# Patient Record
Sex: FEMALE | Race: BLACK OR AFRICAN AMERICAN | NOT HISPANIC OR LATINO | Employment: FULL TIME | URBAN - METROPOLITAN AREA
[De-identification: names, ages, dates, MRNs, and addresses within clinical notes are randomized per-mention and may not be internally consistent; named-entity substitution may affect disease eponyms.]

---

## 2023-06-29 ENCOUNTER — APPOINTMENT (OUTPATIENT)
Dept: PRIMARY CARE | Facility: CLINIC | Age: 48
End: 2023-06-29
Payer: COMMERCIAL

## 2023-07-28 ENCOUNTER — LAB (OUTPATIENT)
Dept: LAB | Facility: LAB | Age: 48
End: 2023-07-28
Payer: COMMERCIAL

## 2023-07-28 ENCOUNTER — OFFICE VISIT (OUTPATIENT)
Dept: PRIMARY CARE | Facility: CLINIC | Age: 48
End: 2023-07-28
Payer: COMMERCIAL

## 2023-07-28 VITALS
SYSTOLIC BLOOD PRESSURE: 108 MMHG | DIASTOLIC BLOOD PRESSURE: 70 MMHG | HEIGHT: 70 IN | BODY MASS INDEX: 31.5 KG/M2 | HEART RATE: 81 BPM | WEIGHT: 220 LBS | OXYGEN SATURATION: 97 %

## 2023-07-28 DIAGNOSIS — K76.0 NAFLD (NONALCOHOLIC FATTY LIVER DISEASE): ICD-10-CM

## 2023-07-28 DIAGNOSIS — E05.90 HYPERTHYROIDISM: ICD-10-CM

## 2023-07-28 DIAGNOSIS — Z12.31 ENCOUNTER FOR SCREENING MAMMOGRAM FOR MALIGNANT NEOPLASM OF BREAST: ICD-10-CM

## 2023-07-28 DIAGNOSIS — E55.9 VITAMIN D DEFICIENCY: ICD-10-CM

## 2023-07-28 DIAGNOSIS — B96.89 BV (BACTERIAL VAGINOSIS): ICD-10-CM

## 2023-07-28 DIAGNOSIS — M25.551 RIGHT HIP PAIN: Primary | ICD-10-CM

## 2023-07-28 DIAGNOSIS — B20 HIV INFECTION, UNSPECIFIED SYMPTOM STATUS (MULTI): ICD-10-CM

## 2023-07-28 DIAGNOSIS — B37.9 YEAST INFECTION: ICD-10-CM

## 2023-07-28 DIAGNOSIS — M25.551 RIGHT HIP PAIN: ICD-10-CM

## 2023-07-28 DIAGNOSIS — Z00.00 WELLNESS EXAMINATION: ICD-10-CM

## 2023-07-28 DIAGNOSIS — N76.0 BV (BACTERIAL VAGINOSIS): ICD-10-CM

## 2023-07-28 DIAGNOSIS — K21.9 GASTROESOPHAGEAL REFLUX DISEASE, UNSPECIFIED WHETHER ESOPHAGITIS PRESENT: ICD-10-CM

## 2023-07-28 PROBLEM — M54.30 SCIATICA: Status: ACTIVE | Noted: 2023-07-28

## 2023-07-28 PROBLEM — D12.6 ADENOMATOUS POLYP OF COLON: Status: ACTIVE | Noted: 2023-07-28

## 2023-07-28 PROBLEM — R06.83 SNORING: Status: ACTIVE | Noted: 2023-07-28

## 2023-07-28 PROBLEM — G89.18 ACUTE POST-OPERATIVE PAIN: Status: ACTIVE | Noted: 2023-07-28

## 2023-07-28 PROBLEM — T78.40XA ALLERGIC RESPONSE: Status: ACTIVE | Noted: 2023-07-28

## 2023-07-28 PROBLEM — J32.9 SINUSITIS: Status: ACTIVE | Noted: 2023-07-28

## 2023-07-28 PROBLEM — E04.0 DIFFUSE GOITER: Status: ACTIVE | Noted: 2023-07-28

## 2023-07-28 PROBLEM — D22.9 NUMEROUS MOLES: Status: ACTIVE | Noted: 2023-07-28

## 2023-07-28 PROBLEM — R53.83 FATIGUE: Status: ACTIVE | Noted: 2023-07-28

## 2023-07-28 PROBLEM — B00.9 HSV-1 INFECTION: Status: ACTIVE | Noted: 2023-07-28

## 2023-07-28 PROBLEM — N91.2 AMENORRHEA: Status: ACTIVE | Noted: 2023-07-28

## 2023-07-28 PROBLEM — G43.909 MIGRAINE: Status: ACTIVE | Noted: 2023-07-28

## 2023-07-28 PROBLEM — R23.2 HOT FLASHES: Status: ACTIVE | Noted: 2023-07-28

## 2023-07-28 PROBLEM — K64.9 HEMORRHOIDS: Status: ACTIVE | Noted: 2023-07-28

## 2023-07-28 PROBLEM — N39.0 UTI (URINARY TRACT INFECTION): Status: ACTIVE | Noted: 2023-07-28

## 2023-07-28 PROBLEM — R35.0 URINARY FREQUENCY: Status: ACTIVE | Noted: 2023-07-28

## 2023-07-28 PROBLEM — H53.8 BLURRY VISION: Status: ACTIVE | Noted: 2023-07-28

## 2023-07-28 PROBLEM — H92.03 OTALGIA OF BOTH EARS: Status: ACTIVE | Noted: 2023-07-28

## 2023-07-28 PROBLEM — B97.7 HPV IN FEMALE: Status: ACTIVE | Noted: 2023-07-28

## 2023-07-28 PROBLEM — K26.9 DUODENAL EROSION: Status: ACTIVE | Noted: 2023-07-28

## 2023-07-28 PROBLEM — J30.2 SEASONAL ALLERGIES: Status: ACTIVE | Noted: 2023-07-28

## 2023-07-28 PROBLEM — R10.2 PELVIC PAIN IN FEMALE: Status: ACTIVE | Noted: 2023-07-28

## 2023-07-28 PROBLEM — B37.31 YEAST INFECTION OF THE VAGINA: Status: ACTIVE | Noted: 2023-07-28

## 2023-07-28 PROBLEM — N93.9 ABNORMAL UTERINE BLEEDING (AUB): Status: ACTIVE | Noted: 2023-07-28

## 2023-07-28 PROBLEM — M54.9 BACK PAIN: Status: ACTIVE | Noted: 2023-07-28

## 2023-07-28 PROBLEM — R87.613 HSIL (HIGH GRADE SQUAMOUS INTRAEPITHELIAL LESION) ON PAP SMEAR OF CERVIX: Status: ACTIVE | Noted: 2023-07-28

## 2023-07-28 PROBLEM — F32.A DEPRESSION: Status: ACTIVE | Noted: 2023-07-28

## 2023-07-28 PROBLEM — K57.90 DIVERTICULOSIS: Status: ACTIVE | Noted: 2023-07-28

## 2023-07-28 PROBLEM — L85.3 DRY SKIN DERMATITIS: Status: ACTIVE | Noted: 2023-07-28

## 2023-07-28 PROBLEM — G47.00 INSOMNIA: Status: ACTIVE | Noted: 2023-07-28

## 2023-07-28 PROBLEM — N89.8 FOUL SMELLING VAGINAL DISCHARGE: Status: ACTIVE | Noted: 2023-07-28

## 2023-07-28 PROBLEM — L91.8 SKIN TAGS, MULTIPLE ACQUIRED: Status: ACTIVE | Noted: 2023-07-28

## 2023-07-28 LAB
ALANINE AMINOTRANSFERASE (SGPT) (U/L) IN SER/PLAS: 20 U/L (ref 7–45)
ALBUMIN (G/DL) IN SER/PLAS: 4.3 G/DL (ref 3.4–5)
ALKALINE PHOSPHATASE (U/L) IN SER/PLAS: 66 U/L (ref 33–110)
ANION GAP IN SER/PLAS: 10 MMOL/L (ref 10–20)
ASPARTATE AMINOTRANSFERASE (SGOT) (U/L) IN SER/PLAS: 21 U/L (ref 9–39)
BILIRUBIN TOTAL (MG/DL) IN SER/PLAS: 0.3 MG/DL (ref 0–1.2)
CALCIDIOL (25 OH VITAMIN D3) (NG/ML) IN SER/PLAS: 36 NG/ML
CALCIUM (MG/DL) IN SER/PLAS: 10 MG/DL (ref 8.6–10.6)
CARBON DIOXIDE, TOTAL (MMOL/L) IN SER/PLAS: 26 MMOL/L (ref 21–32)
CHLORIDE (MMOL/L) IN SER/PLAS: 106 MMOL/L (ref 98–107)
CHOLESTEROL (MG/DL) IN SER/PLAS: 201 MG/DL (ref 0–199)
CHOLESTEROL IN HDL (MG/DL) IN SER/PLAS: 57.5 MG/DL
CHOLESTEROL/HDL RATIO: 3.5
CREATININE (MG/DL) IN SER/PLAS: 0.9 MG/DL (ref 0.5–1.05)
ERYTHROCYTE DISTRIBUTION WIDTH (RATIO) BY AUTOMATED COUNT: 14.7 % (ref 11.5–14.5)
ERYTHROCYTE MEAN CORPUSCULAR HEMOGLOBIN CONCENTRATION (G/DL) BY AUTOMATED: 32.1 G/DL (ref 32–36)
ERYTHROCYTE MEAN CORPUSCULAR VOLUME (FL) BY AUTOMATED COUNT: 86 FL (ref 80–100)
ERYTHROCYTES (10*6/UL) IN BLOOD BY AUTOMATED COUNT: 4.66 X10E12/L (ref 4–5.2)
GFR FEMALE: 78 ML/MIN/1.73M2
GLUCOSE (MG/DL) IN SER/PLAS: 91 MG/DL (ref 74–99)
HEMATOCRIT (%) IN BLOOD BY AUTOMATED COUNT: 40.2 % (ref 36–46)
HEMOGLOBIN (G/DL) IN BLOOD: 12.9 G/DL (ref 12–16)
LDL: 122 MG/DL (ref 0–99)
LEUKOCYTES (10*3/UL) IN BLOOD BY AUTOMATED COUNT: 6.9 X10E9/L (ref 4.4–11.3)
NRBC (PER 100 WBCS) BY AUTOMATED COUNT: 0 /100 WBC (ref 0–0)
PLATELETS (10*3/UL) IN BLOOD AUTOMATED COUNT: 245 X10E9/L (ref 150–450)
POTASSIUM (MMOL/L) IN SER/PLAS: 4.4 MMOL/L (ref 3.5–5.3)
PROTEIN TOTAL: 7.1 G/DL (ref 6.4–8.2)
SODIUM (MMOL/L) IN SER/PLAS: 138 MMOL/L (ref 136–145)
THYROTROPIN (MIU/L) IN SER/PLAS BY DETECTION LIMIT <= 0.05 MIU/L: 1.91 MIU/L (ref 0.44–3.98)
TRIGLYCERIDE (MG/DL) IN SER/PLAS: 109 MG/DL (ref 0–149)
UREA NITROGEN (MG/DL) IN SER/PLAS: 11 MG/DL (ref 6–23)
VLDL: 22 MG/DL (ref 0–40)

## 2023-07-28 PROCEDURE — 99214 OFFICE O/P EST MOD 30 MIN: CPT | Performed by: STUDENT IN AN ORGANIZED HEALTH CARE EDUCATION/TRAINING PROGRAM

## 2023-07-28 PROCEDURE — 80053 COMPREHEN METABOLIC PANEL: CPT

## 2023-07-28 PROCEDURE — 82306 VITAMIN D 25 HYDROXY: CPT

## 2023-07-28 PROCEDURE — 80061 LIPID PANEL: CPT

## 2023-07-28 PROCEDURE — 36415 COLL VENOUS BLD VENIPUNCTURE: CPT

## 2023-07-28 PROCEDURE — 1036F TOBACCO NON-USER: CPT | Performed by: STUDENT IN AN ORGANIZED HEALTH CARE EDUCATION/TRAINING PROGRAM

## 2023-07-28 PROCEDURE — 90715 TDAP VACCINE 7 YRS/> IM: CPT | Performed by: STUDENT IN AN ORGANIZED HEALTH CARE EDUCATION/TRAINING PROGRAM

## 2023-07-28 PROCEDURE — 85027 COMPLETE CBC AUTOMATED: CPT

## 2023-07-28 PROCEDURE — 90471 IMMUNIZATION ADMIN: CPT | Performed by: STUDENT IN AN ORGANIZED HEALTH CARE EDUCATION/TRAINING PROGRAM

## 2023-07-28 PROCEDURE — 99396 PREV VISIT EST AGE 40-64: CPT | Performed by: STUDENT IN AN ORGANIZED HEALTH CARE EDUCATION/TRAINING PROGRAM

## 2023-07-28 PROCEDURE — 84443 ASSAY THYROID STIM HORMONE: CPT

## 2023-07-28 RX ORDER — METRONIDAZOLE 7.5 MG/G
1 GEL VAGINAL 2 TIMES DAILY
Qty: 70 G | Refills: 0 | Status: SHIPPED | OUTPATIENT
Start: 2023-07-28 | End: 2023-08-02

## 2023-07-28 RX ORDER — IBUPROFEN 800 MG/1
TABLET ORAL
COMMUNITY
Start: 2023-03-17 | End: 2023-07-28 | Stop reason: ALTCHOICE

## 2023-07-28 RX ORDER — NORETHINDRONE ACETATE AND ETHINYL ESTRADIOL 1MG-20(21)
KIT ORAL
COMMUNITY
Start: 2022-12-09 | End: 2023-07-28 | Stop reason: ALTCHOICE

## 2023-07-28 RX ORDER — FLUCONAZOLE 150 MG/1
150 TABLET ORAL ONCE
Qty: 1 TABLET | Refills: 0 | Status: SHIPPED | OUTPATIENT
Start: 2023-07-28 | End: 2023-07-28

## 2023-07-28 ASSESSMENT — COLUMBIA-SUICIDE SEVERITY RATING SCALE - C-SSRS
6. HAVE YOU EVER DONE ANYTHING, STARTED TO DO ANYTHING, OR PREPARED TO DO ANYTHING TO END YOUR LIFE?: NO
2. HAVE YOU ACTUALLY HAD ANY THOUGHTS OF KILLING YOURSELF?: NO
1. IN THE PAST MONTH, HAVE YOU WISHED YOU WERE DEAD OR WISHED YOU COULD GO TO SLEEP AND NOT WAKE UP?: NO

## 2023-07-28 ASSESSMENT — PATIENT HEALTH QUESTIONNAIRE - PHQ9
1. LITTLE INTEREST OR PLEASURE IN DOING THINGS: NOT AT ALL
2. FEELING DOWN, DEPRESSED OR HOPELESS: NOT AT ALL
SUM OF ALL RESPONSES TO PHQ9 QUESTIONS 1 AND 2: 0

## 2023-07-28 ASSESSMENT — ENCOUNTER SYMPTOMS
OCCASIONAL FEELINGS OF UNSTEADINESS: 0
LOSS OF SENSATION IN FEET: 0
DEPRESSION: 0

## 2023-07-28 NOTE — PROGRESS NOTES
HPI: 47-year-old female presenting for follow-up on multiple concerns, CPE.    TMJ dysfunction  Resolved    Right hip pain  Chronic, insidious onset.  Did not do physical therapy last time    HIV  Following with specialist    NAFLD  Incidental finding on CT, no elevated LFTs on last lab.  Asymptomatic.    Vaginal pruritus and discharge  She describes the discharge as likely chunky, but has history of recurrent BV.  Was treated 2 months ago with metronidazole, resolved for 2 months.    SocHx:   - Smoking: Quit 3 years ago   - Alcohol: None   - Drug use: None    Denies HA, changes in vision, chest pain, SOB/CHAN, palpitations, N/V/D/C, dysuria/hematuria, hematochezia/melena, paresthesias, LE edema.    12 point ROS reviewed and negative other than as stated in HPI      General: Alert, oriented, pleasant, in no acute distress  HEENT:      Head: normocephalic, atraumatic;      eyes: EOMI, no scleral icterus;   Neck: soft, supple, non-tender, no masses appreciated  CV: Heart with regular rate and rhythm, normal S1/S2, no murmurs  Lungs: CTAB without wheezing, rhonchi or rales; good respiratory effort, no increased work of breathing  Abdomen: soft, non-tender, non-distended, no masses appreciated  Extremities: no edema, no cyanosis  Neuro: Cranial nerves grossly intact; alert and oriented, normal gait  Psych: Appropriate mood and affect    1. HM  -CBC, CMP, Lipid panel, Vit D, TSH with reflex T4  -Vaccines:       Flu: out of  season      Shingrix: Will be done in a couple weeks      Pneumococcal: Follows       Tdap: IO  -Gladis w/ lobo: ordered  -Last PAP: UTD, follows with GYN  -Lung cancer screening with low-dose CT: quit 3 years ago  -Colonoscopy: 2022, 5 year plan  -Osteoporosis screening: at 63    2.  TMJ dysfunction  - Resolved with 6-week protocol     3. R hip pain  -Physical therapy referral     4.  Vaginal discharge/pruritus  - History more concerning for candidiasis, declined swab in office  - We will trial  fluconazole 150 mg x 1  - Metronidazole gel given as pocket prescription     5. HIV  -Follows with specialist     6. NAFLD  -Most current LFTs within normal limits  - Ultrasound liver    F/U 6 months or sooner if indicated    Chris D'Amico, DO

## 2023-07-31 ENCOUNTER — DOCUMENTATION (OUTPATIENT)
Dept: PRIMARY CARE | Facility: CLINIC | Age: 48
End: 2023-07-31
Payer: COMMERCIAL

## 2023-07-31 ENCOUNTER — TELEPHONE (OUTPATIENT)
Dept: PRIMARY CARE | Facility: CLINIC | Age: 48
End: 2023-07-31
Payer: COMMERCIAL

## 2023-07-31 NOTE — TELEPHONE ENCOUNTER
Provided patient with lab results. Pt expressed understanding.      ----- Message from Christopher D'Amico, DO sent at 7/31/2023  8:46 AM EDT -----  LDL borderline high at 122, however very low ASCVD risk.  Medication not warranted at this time.  Continue to practice healthy dietary habits.    Remaining labs unremarkable.

## 2023-07-31 NOTE — RESULT ENCOUNTER NOTE
LDL borderline high at 122, however very low ASCVD risk.  Medication not warranted at this time.  Continue to practice healthy dietary habits.    Remaining labs unremarkable.

## 2023-08-11 ENCOUNTER — TELEPHONE (OUTPATIENT)
Dept: PRIMARY CARE | Facility: CLINIC | Age: 48
End: 2023-08-11
Payer: COMMERCIAL

## 2023-08-11 DIAGNOSIS — L60.0 INGROWN NAIL: Primary | ICD-10-CM

## 2023-08-11 NOTE — TELEPHONE ENCOUNTER
Left message for patient on vm///Patient is requesting a referral for Podiatry, she has an ingrown nail

## 2023-10-13 ENCOUNTER — PHARMACY VISIT (OUTPATIENT)
Dept: PHARMACY | Facility: CLINIC | Age: 48
End: 2023-10-13
Payer: MEDICARE

## 2023-10-13 PROCEDURE — RXMED WILLOW AMBULATORY MEDICATION CHARGE

## 2023-11-08 ENCOUNTER — PHARMACY VISIT (OUTPATIENT)
Dept: PHARMACY | Facility: CLINIC | Age: 48
End: 2023-11-08
Payer: MEDICARE

## 2023-11-16 ENCOUNTER — PHARMACY VISIT (OUTPATIENT)
Dept: PHARMACY | Facility: CLINIC | Age: 48
End: 2023-11-16
Payer: MEDICARE

## 2023-11-16 PROCEDURE — RXMED WILLOW AMBULATORY MEDICATION CHARGE

## 2024-01-04 ENCOUNTER — TELEPHONE (OUTPATIENT)
Dept: PRIMARY CARE | Facility: CLINIC | Age: 49
End: 2024-01-04
Payer: COMMERCIAL

## 2024-01-04 NOTE — TELEPHONE ENCOUNTER
LEFT PATIENT A DETAILED MESSAGE///Patient lost her mother on 12/30/2023 and is requesting medication to help her sleep and for anxiety, her Psychiatrist advised her to ask her pcp?

## 2024-01-05 DIAGNOSIS — G47.00 INSOMNIA, UNSPECIFIED TYPE: Primary | ICD-10-CM

## 2024-01-05 RX ORDER — HYDROXYZINE HYDROCHLORIDE 25 MG/1
25 TABLET, FILM COATED ORAL 2 TIMES DAILY
Qty: 60 TABLET | Refills: 0 | Status: SHIPPED | OUTPATIENT
Start: 2024-01-05 | End: 2024-02-04

## 2024-01-11 ENCOUNTER — APPOINTMENT (OUTPATIENT)
Dept: IMMUNOLOGY | Facility: CLINIC | Age: 49
End: 2024-01-11
Payer: COMMERCIAL

## 2024-01-11 ENCOUNTER — PHARMACY VISIT (OUTPATIENT)
Dept: PHARMACY | Facility: CLINIC | Age: 49
End: 2024-01-11
Payer: MEDICARE

## 2024-01-11 PROCEDURE — RXMED WILLOW AMBULATORY MEDICATION CHARGE

## 2024-01-25 PROBLEM — B20 HUMAN IMMUNODEFICIENCY VIRUS INFECTION (MULTI): Status: ACTIVE | Noted: 2024-01-25

## 2024-01-25 PROBLEM — L91.0 KELOID: Status: ACTIVE | Noted: 2022-04-07

## 2024-01-25 PROBLEM — F17.200 TOBACCO DEPENDENCE SYNDROME: Status: ACTIVE | Noted: 2024-01-25

## 2024-01-25 PROBLEM — Z21 HUMAN IMMUNODEFICIENCY VIRUS INFECTION: Status: ACTIVE | Noted: 2024-01-25

## 2024-02-26 DIAGNOSIS — B20 HUMAN IMMUNODEFICIENCY VIRUS (MULTI): Primary | ICD-10-CM

## 2024-02-26 RX ORDER — ELVITEGRAVIR, COBICISTAT, EMTRICITABINE, AND TENOFOVIR DISOPROXIL FUMARATE 150; 150; 200; 300 MG/1; MG/1; MG/1; MG/1
1 TABLET, FILM COATED ORAL DAILY
Qty: 30 TABLET | Refills: 1 | Status: SHIPPED | OUTPATIENT
Start: 2024-02-26 | End: 2024-04-26 | Stop reason: SDUPTHER

## 2024-02-27 PROCEDURE — RXMED WILLOW AMBULATORY MEDICATION CHARGE

## 2024-02-28 ENCOUNTER — PHARMACY VISIT (OUTPATIENT)
Dept: PHARMACY | Facility: CLINIC | Age: 49
End: 2024-02-28
Payer: MEDICARE

## 2024-03-27 PROCEDURE — RXMED WILLOW AMBULATORY MEDICATION CHARGE

## 2024-03-30 ENCOUNTER — PHARMACY VISIT (OUTPATIENT)
Dept: PHARMACY | Facility: CLINIC | Age: 49
End: 2024-03-30
Payer: MEDICARE

## 2024-04-26 DIAGNOSIS — B20 HIV INFECTION, UNSPECIFIED SYMPTOM STATUS (MULTI): ICD-10-CM

## 2024-04-26 DIAGNOSIS — Z11.3 SCREENING FOR STD (SEXUALLY TRANSMITTED DISEASE): ICD-10-CM

## 2024-04-26 DIAGNOSIS — Z79.899 HIGH RISK MEDICATION USE: ICD-10-CM

## 2024-04-26 DIAGNOSIS — B20 HUMAN IMMUNODEFICIENCY VIRUS (MULTI): ICD-10-CM

## 2024-04-26 PROCEDURE — RXMED WILLOW AMBULATORY MEDICATION CHARGE

## 2024-04-26 RX ORDER — ELVITEGRAVIR, COBICISTAT, EMTRICITABINE, AND TENOFOVIR DISOPROXIL FUMARATE 150; 150; 200; 300 MG/1; MG/1; MG/1; MG/1
1 TABLET, FILM COATED ORAL DAILY
Qty: 30 TABLET | Refills: 0 | Status: SHIPPED | OUTPATIENT
Start: 2024-04-26

## 2024-04-26 NOTE — TELEPHONE ENCOUNTER
Patient calling for refill on Stribild. She recently moved and unable to locate pills. She has been off x 2 weeks. Will send refill to [facility]. Also, pt has missed last appt w/ Dr. Castillo. No labs since April of 2022. Told pt she must come in for lab work. Pt agreed to next week.

## 2024-04-30 ENCOUNTER — PHARMACY VISIT (OUTPATIENT)
Dept: PHARMACY | Facility: CLINIC | Age: 49
End: 2024-04-30
Payer: MEDICARE

## 2024-07-05 DIAGNOSIS — B20 HUMAN IMMUNODEFICIENCY VIRUS (MULTI): ICD-10-CM

## 2024-07-08 PROCEDURE — RXMED WILLOW AMBULATORY MEDICATION CHARGE

## 2024-07-08 RX ORDER — ELVITEGRAVIR, COBICISTAT, EMTRICITABINE, AND TENOFOVIR DISOPROXIL FUMARATE 150; 150; 200; 300 MG/1; MG/1; MG/1; MG/1
1 TABLET, FILM COATED ORAL DAILY
Qty: 30 TABLET | Refills: 0 | Status: SHIPPED | OUTPATIENT
Start: 2024-07-08

## 2024-07-13 ENCOUNTER — PHARMACY VISIT (OUTPATIENT)
Dept: PHARMACY | Facility: CLINIC | Age: 49
End: 2024-07-13
Payer: MEDICARE

## 2024-08-01 ENCOUNTER — APPOINTMENT (OUTPATIENT)
Dept: IMMUNOLOGY | Facility: CLINIC | Age: 49
End: 2024-08-01
Payer: COMMERCIAL

## 2024-08-27 ENCOUNTER — APPOINTMENT (OUTPATIENT)
Dept: PRIMARY CARE | Facility: CLINIC | Age: 49
End: 2024-08-27
Payer: COMMERCIAL

## 2024-09-09 ENCOUNTER — HOSPITAL ENCOUNTER (EMERGENCY)
Facility: HOSPITAL | Age: 49
Discharge: HOME | End: 2024-09-09
Payer: COMMERCIAL

## 2024-09-09 PROCEDURE — 4500999001 HC ED NO CHARGE: Performed by: STUDENT IN AN ORGANIZED HEALTH CARE EDUCATION/TRAINING PROGRAM

## 2024-09-19 DIAGNOSIS — B20 HUMAN IMMUNODEFICIENCY VIRUS (MULTI): ICD-10-CM

## 2024-09-19 DIAGNOSIS — B20 HIV DISEASE (MULTI): ICD-10-CM

## 2024-09-19 DIAGNOSIS — B20 HIV DISEASE (MULTI): Primary | ICD-10-CM

## 2024-09-19 RX ORDER — ELVITEGRAVIR, COBICISTAT, EMTRICITABINE, AND TENOFOVIR DISOPROXIL FUMARATE 150; 150; 200; 300 MG/1; MG/1; MG/1; MG/1
1 TABLET, FILM COATED ORAL DAILY
Qty: 30 TABLET | Refills: 0 | Status: SHIPPED | OUTPATIENT
Start: 2024-09-19

## 2024-09-19 RX ORDER — ELVITEGRAVIR, COBICISTAT, EMTRICITABINE, AND TENOFOVIR DISOPROXIL FUMARATE 150; 150; 200; 300 MG/1; MG/1; MG/1; MG/1
1 TABLET, FILM COATED ORAL DAILY
Qty: 30 TABLET | Refills: 0 | Status: SHIPPED | OUTPATIENT
Start: 2024-09-19 | End: 2024-09-19 | Stop reason: SDUPTHER

## 2024-09-30 PROCEDURE — RXMED WILLOW AMBULATORY MEDICATION CHARGE

## 2024-10-03 ENCOUNTER — OFFICE VISIT (OUTPATIENT)
Dept: IMMUNOLOGY | Facility: CLINIC | Age: 49
End: 2024-10-03
Payer: COMMERCIAL

## 2024-10-03 VITALS
BODY MASS INDEX: 31.12 KG/M2 | HEART RATE: 94 BPM | OXYGEN SATURATION: 99 % | RESPIRATION RATE: 16 BRPM | WEIGHT: 217.4 LBS | TEMPERATURE: 98 F | HEIGHT: 70 IN | DIASTOLIC BLOOD PRESSURE: 85 MMHG | SYSTOLIC BLOOD PRESSURE: 124 MMHG

## 2024-10-03 DIAGNOSIS — G47.00 INSOMNIA, UNSPECIFIED TYPE: ICD-10-CM

## 2024-10-03 DIAGNOSIS — Z12.31 ENCOUNTER FOR SCREENING MAMMOGRAM FOR MALIGNANT NEOPLASM OF BREAST: ICD-10-CM

## 2024-10-03 DIAGNOSIS — Z21 ASYMPTOMATIC HIV INFECTION, WITH NO HISTORY OF HIV-RELATED ILLNESS (MULTI): Primary | ICD-10-CM

## 2024-10-03 DIAGNOSIS — J30.9 ALLERGIC RHINITIS, UNSPECIFIED SEASONALITY, UNSPECIFIED TRIGGER: ICD-10-CM

## 2024-10-03 DIAGNOSIS — E04.0 DIFFUSE GOITER: ICD-10-CM

## 2024-10-03 DIAGNOSIS — Z21 HIV INFECTION, UNSPECIFIED SYMPTOM STATUS: ICD-10-CM

## 2024-10-03 DIAGNOSIS — Z79.899 HIGH RISK MEDICATION USE: ICD-10-CM

## 2024-10-03 DIAGNOSIS — Z11.3 SCREENING FOR STD (SEXUALLY TRANSMITTED DISEASE): ICD-10-CM

## 2024-10-03 LAB
ALBUMIN SERPL BCP-MCNC: 4.4 G/DL (ref 3.4–5)
ALP SERPL-CCNC: 59 U/L (ref 33–110)
ALT SERPL W P-5'-P-CCNC: 12 U/L (ref 7–45)
ANION GAP SERPL CALC-SCNC: 12 MMOL/L (ref 10–20)
AST SERPL W P-5'-P-CCNC: 12 U/L (ref 9–39)
BASOPHILS # BLD AUTO: 0.06 X10*3/UL (ref 0–0.1)
BASOPHILS NFR BLD AUTO: 0.9 %
BILIRUB SERPL-MCNC: 0.3 MG/DL (ref 0–1.2)
BUN SERPL-MCNC: 8 MG/DL (ref 6–23)
CALCIUM SERPL-MCNC: 9.8 MG/DL (ref 8.6–10.6)
CHLORIDE SERPL-SCNC: 106 MMOL/L (ref 98–107)
CHOLEST SERPL-MCNC: 211 MG/DL (ref 0–199)
CHOLESTEROL/HDL RATIO: 3.6
CO2 SERPL-SCNC: 24 MMOL/L (ref 21–32)
CREAT SERPL-MCNC: 0.77 MG/DL (ref 0.5–1.05)
EGFRCR SERPLBLD CKD-EPI 2021: >90 ML/MIN/1.73M*2
EOSINOPHIL # BLD AUTO: 0.21 X10*3/UL (ref 0–0.7)
EOSINOPHIL NFR BLD AUTO: 3.1 %
ERYTHROCYTE [DISTWIDTH] IN BLOOD BY AUTOMATED COUNT: 15 % (ref 11.5–14.5)
GLUCOSE SERPL-MCNC: 78 MG/DL (ref 74–99)
HBV CORE AB SER QL: NONREACTIVE
HCT VFR BLD AUTO: 39.3 % (ref 36–46)
HDLC SERPL-MCNC: 58.9 MG/DL
HGB BLD-MCNC: 12.8 G/DL (ref 12–16)
IMM GRANULOCYTES # BLD AUTO: 0.02 X10*3/UL (ref 0–0.7)
IMM GRANULOCYTES NFR BLD AUTO: 0.3 % (ref 0–0.9)
LDLC SERPL CALC-MCNC: 127 MG/DL
LYMPHOCYTES # BLD AUTO: 2.75 X10*3/UL (ref 1.2–4.8)
LYMPHOCYTES NFR BLD AUTO: 40.6 %
MCH RBC QN AUTO: 27 PG (ref 26–34)
MCHC RBC AUTO-ENTMCNC: 32.6 G/DL (ref 32–36)
MCV RBC AUTO: 83 FL (ref 80–100)
MONOCYTES # BLD AUTO: 0.36 X10*3/UL (ref 0.1–1)
MONOCYTES NFR BLD AUTO: 5.3 %
NEUTROPHILS # BLD AUTO: 3.37 X10*3/UL (ref 1.2–7.7)
NEUTROPHILS NFR BLD AUTO: 49.8 %
NON HDL CHOLESTEROL: 152 MG/DL (ref 0–149)
NRBC BLD-RTO: 0 /100 WBCS (ref 0–0)
PLATELET # BLD AUTO: 224 X10*3/UL (ref 150–450)
POTASSIUM SERPL-SCNC: 4.1 MMOL/L (ref 3.5–5.3)
PROT SERPL-MCNC: 7.1 G/DL (ref 6.4–8.2)
RBC # BLD AUTO: 4.74 X10*6/UL (ref 4–5.2)
SODIUM SERPL-SCNC: 138 MMOL/L (ref 136–145)
TREPONEMA PALLIDUM IGG+IGM AB [PRESENCE] IN SERUM OR PLASMA BY IMMUNOASSAY: NONREACTIVE
TRIGL SERPL-MCNC: 125 MG/DL (ref 0–149)
TSH SERPL-ACNC: 1.48 MIU/L (ref 0.44–3.98)
VLDL: 25 MG/DL (ref 0–40)
WBC # BLD AUTO: 6.8 X10*3/UL (ref 4.4–11.3)

## 2024-10-03 PROCEDURE — RXMED WILLOW AMBULATORY MEDICATION CHARGE

## 2024-10-03 PROCEDURE — 86704 HEP B CORE ANTIBODY TOTAL: CPT | Performed by: INTERNAL MEDICINE

## 2024-10-03 PROCEDURE — 99215 OFFICE O/P EST HI 40 MIN: CPT | Performed by: INTERNAL MEDICINE

## 2024-10-03 PROCEDURE — 3008F BODY MASS INDEX DOCD: CPT | Performed by: INTERNAL MEDICINE

## 2024-10-03 PROCEDURE — 86780 TREPONEMA PALLIDUM: CPT | Performed by: INTERNAL MEDICINE

## 2024-10-03 PROCEDURE — 87491 CHLMYD TRACH DNA AMP PROBE: CPT | Performed by: INTERNAL MEDICINE

## 2024-10-03 PROCEDURE — 85025 COMPLETE CBC W/AUTO DIFF WBC: CPT | Performed by: INTERNAL MEDICINE

## 2024-10-03 PROCEDURE — 99417 PROLNG OP E/M EACH 15 MIN: CPT | Performed by: INTERNAL MEDICINE

## 2024-10-03 PROCEDURE — 1036F TOBACCO NON-USER: CPT | Performed by: INTERNAL MEDICINE

## 2024-10-03 PROCEDURE — 88185 FLOWCYTOMETRY/TC ADD-ON: CPT | Performed by: INTERNAL MEDICINE

## 2024-10-03 PROCEDURE — 84520 ASSAY OF UREA NITROGEN: CPT | Performed by: INTERNAL MEDICINE

## 2024-10-03 PROCEDURE — 90656 IIV3 VACC NO PRSV 0.5 ML IM: CPT | Performed by: INTERNAL MEDICINE

## 2024-10-03 PROCEDURE — 4274F FLU IMMUNO ADMIND RCVD: CPT | Performed by: INTERNAL MEDICINE

## 2024-10-03 PROCEDURE — 84443 ASSAY THYROID STIM HORMONE: CPT | Performed by: INTERNAL MEDICINE

## 2024-10-03 PROCEDURE — 87536 HIV-1 QUANT&REVRSE TRNSCRPJ: CPT | Performed by: INTERNAL MEDICINE

## 2024-10-03 PROCEDURE — 80061 LIPID PANEL: CPT | Performed by: INTERNAL MEDICINE

## 2024-10-03 PROCEDURE — 36415 COLL VENOUS BLD VENIPUNCTURE: CPT | Performed by: INTERNAL MEDICINE

## 2024-10-03 RX ORDER — DESLORATADINE 5 MG/1
5 TABLET ORAL DAILY
Qty: 30 TABLET | Refills: 11 | Status: SHIPPED | OUTPATIENT
Start: 2024-10-03 | End: 2025-10-03

## 2024-10-03 ASSESSMENT — PAIN SCALES - GENERAL: PAINLEVEL: 0-NO PAIN

## 2024-10-03 NOTE — ASSESSMENT & PLAN NOTE
Returning to clinic after being absent for > 2 years.  Has continued taking Stribild daily until 3 weeks ago.    Previously, she remained immunologically intact and virologically suppressed on Stribild.    Routine labs will be collected today: CBC w/ diff, Hepatic panel, renal panel, CD4, HIV RNA PCR, and HIV genotype.    If she is undetectable, may transition to Biktarvy.    Key chain pill box given to her for ability to carry med discreetly to work.    Vaccines updated: agreeable to Flu vaccination  STD screening not indicated as not sexually active.    Will RTC in 4 months

## 2024-10-03 NOTE — PROGRESS NOTES
"HIV Clinic Follow-up Visit:    Aggie Villalta was last seen in [facility] on 7/28/22    Missed antiretroviral doses in last 72 hours? Yes, 3 weeks.     Sexually active? no, Partner/s aware of diagnosis? NA,     Condom use?  NA , Partner on PrEP? NA    Tobacco use: No     SUBJECTIVE:   Last seen in 7/2022.  States that she has been taking her medications daily except for last 3 weeks as she has been staying with her daughters as lost her housing.  Initially stated that she was not taking meds for 3 weeks then clarified that she has taken them \"off&on\" for last 3 weeks. Misses because she \"doesn't have them with her\".  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    Diagnosed with Double ear infection at an urgent care in winter.  She had no ear pian just \"itchy ears\" and post nasal drip.  Prescribed meds, little help.  States that she has had them twice. 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  Denies tinnitus or ringing.    Denies ear pain.  Endorses the symptoms of ear popping, itching in ears and post nasal drip.  Also with occasional runny nose. States that these get worse with winter.  Also notes occasional muffled hearing.    Lost her mother in December after multiple hospitalizations at [facility] then at . She is still mourning her loss.    Never sleeps well.  Difficulty with initiating sleep then does not sleep soundly.  States that Melatonin makes her feel groggy as did benadryl.      Review of Systems  Full 10 point ROS performed.  All pertinent positives and negatives as documented in HPI.     CURRENT MEDICATIONS:    Current Outpatient Medications:     desloratadine (Clarinex) 5 mg tablet, Take 1 tablet (5 mg) by mouth once daily., Disp: 30 tablet, Rfl: 11    Stribild [id number] mg tablet, Take 1 tablet by mouth once daily., Disp: 30 tablet, Rfl: 0    PHYSICAL EXAMINATION:  Visit Vitals  BP " "124/85 (BP Location: Left arm, Patient Position: Sitting, BP Cuff Size: Large adult)   Pulse 94   Temp 36.7 °C (98 °F) (Skin)   Resp 16   Ht 1.778 m (5' 10\")   Wt 98.6 kg (217 lb 6.4 oz)   SpO2 99%   BMI 31.19 kg/m²   Smoking Status Former   BSA 2.21 m²       Physical Exam   Physical Exam  Vitals reviewed.   Constitutional:       General: She is not in acute distress.     Appearance: Normal appearance.   HENT:      Head: Normocephalic and atraumatic.      Right Ear: External ear normal. No drainage or tenderness. Tympanic membrane is retracted. Tympanic membrane is not bulging.      Left Ear: Tympanic membrane, ear canal and external ear normal. No drainage or tenderness.      Nose: No signs of injury, congestion or rhinorrhea.      Right Turbinates: Not enlarged or swollen.      Left Turbinates: Swollen (erythema present).      Right Sinus: No maxillary sinus tenderness or frontal sinus tenderness.      Left Sinus: No maxillary sinus tenderness or frontal sinus tenderness.      Mouth/Throat:      Mouth: Mucous membranes are moist.      Pharynx: No oropharyngeal exudate or posterior oropharyngeal erythema.   Eyes:      General: No scleral icterus.     Extraocular Movements: Extraocular movements intact.      Conjunctiva/sclera: Conjunctivae normal.      Pupils: Pupils are equal, round, and reactive to light.   Neck:      Thyroid: Thyromegaly (much less prominent than prior exams) present.   Cardiovascular:      Rate and Rhythm: Normal rate and regular rhythm.      Pulses: Normal pulses.      Heart sounds: Normal heart sounds.   Pulmonary:      Effort: Pulmonary effort is normal.      Breath sounds: Normal breath sounds.   Abdominal:      General: Bowel sounds are normal. There is no distension.      Palpations: Abdomen is soft.   Musculoskeletal:      Cervical back: Normal range of motion and neck supple.   Lymphadenopathy:      Cervical: No cervical adenopathy.   Skin:     General: Skin is warm and dry. "   Neurological:      General: No focal deficit present.      Mental Status: She is alert and oriented to person, place, and time.      Cranial Nerves: No cranial nerve deficit.      Deep Tendon Reflexes: Reflexes normal.   Psychiatric:         Mood and Affect: Mood normal.         PERTINENT DATA:  CBC:  WBC   Date Value Ref Range Status   07/28/2023 6.9 4.4 - 11.3 x10E9/L Final     nRBC   Date Value Ref Range Status   07/28/2023 0.0 0.0 - 0.0 /100 WBC Final     RBC   Date Value Ref Range Status   07/28/2023 4.66 4.00 - 5.20 x10E12/L Final     Hemoglobin   Date Value Ref Range Status   07/28/2023 12.9 12.0 - 16.0 g/dL Final     MCV   Date Value Ref Range Status   07/28/2023 86 80 - 100 fL Final     RDW   Date Value Ref Range Status   07/28/2023 14.7 (H) 11.5 - 14.5 % Final       Renal Function Panel:  Glucose   Date Value Ref Range Status   07/28/2023 91 74 - 99 mg/dL Final     Sodium   Date Value Ref Range Status   07/28/2023 138 136 - 145 mmol/L Final     Potassium   Date Value Ref Range Status   07/28/2023 4.4 3.5 - 5.3 mmol/L Final     Chloride   Date Value Ref Range Status   07/28/2023 106 98 - 107 mmol/L Final     Anion Gap   Date Value Ref Range Status   07/28/2023 10 10 - 20 mmol/L Final     Urea Nitrogen   Date Value Ref Range Status   07/28/2023 11 6 - 23 mg/dL Final     Creatinine   Date Value Ref Range Status   07/28/2023 0.90 0.50 - 1.05 mg/dL Final     Calcium   Date Value Ref Range Status   07/28/2023 10.0 8.6 - 10.6 mg/dL Final     Phosphorus   Date Value Ref Range Status   03/01/2021 3.2 2.5 - 4.9 mg/dL Final     Comment:      The performance characteristics of phosphorus testing in   heparinized plasma have been validated by the individual     laboratory site where testing is performed. Testing    on heparinized plasma is not approved by the FDA;    however, such approval is not necessary.       Albumin   Date Value Ref Range Status   07/28/2023 4.3 3.4 - 5.0 g/dL Final       Hepatic  "Panel:  Albumin   Date Value Ref Range Status   07/28/2023 4.3 3.4 - 5.0 g/dL Final     Total Bilirubin   Date Value Ref Range Status   07/28/2023 0.3 0.0 - 1.2 mg/dL Final     Bilirubin, Direct   Date Value Ref Range Status   03/01/2021 0.1 0.0 - 0.3 mg/dL Final     Alkaline Phosphatase   Date Value Ref Range Status   07/28/2023 66 33 - 110 U/L Final     ALT (SGPT)   Date Value Ref Range Status   07/28/2023 20 7 - 45 U/L Final     Comment:      Patients treated with Sulfasalazine may generate    falsely decreased results for ALT.       HIV Viral Load:  No results found for: \"RFF5MIPYGH\", \"HIVRNAPCR\"    CD4 Count:  CD3+CD4+%   Date Value Ref Range Status   04/21/2022 55 29 - 57 % Final     CD3+CD4+ Absolute   Date Value Ref Range Status   04/21/2022 1.502 0.350 - 2.740 x10E9/L Final     CD3+CD8+%   Date Value Ref Range Status   04/21/2022 24 7 - 31 % Final     CD3+CD8+ Absolute   Date Value Ref Range Status   04/21/2022 0.655 0.080 - 1.490 x10E9/L Final     CD4/CD8 Ratio   Date Value Ref Range Status   04/21/2022 2.29 1.00 - 3.50 Final     CD45%   Date Value Ref Range Status   04/21/2022 100 % Final       CRCL:  No results found for: \"URINEVOLUME\", \"CREATU\", \"[id number]\", \"CRCLEARANCE\"    Lipid Panel:  HDL   Date Value Ref Range Status   07/28/2023 57.5 mg/dL Final     Comment:     .      AGE      VERY LOW   LOW     NORMAL    HIGH       0-19 Y       < 35   < 40     40-45     ----    20-24 Y       ----   < 40       >45     ----      >24 Y       ----   < 40     40-60      >60  .     Cholesterol/HDL Ratio   Date Value Ref Range Status   07/28/2023 3.5  Final     Comment:     REF VALUES  DESIRABLE  < 3.4  HIGH RISK  > 5.0     LDL   Date Value Ref Range Status   07/28/2023 122 (H) 0 - 99 mg/dL Final     Comment:     .                           NEAR      BORD      AGE      DESIRABLE  OPTIMAL    HIGH     HIGH     VERY HIGH     0-19 Y     0 - 109     ---    110-129   >/= 130     ----    20-24 Y     0 - 119     ---    " "120-159   >/= 160     ----      >24 Y     0 -  99   100-129  130-159   160-189     >/=190  .     VLDL   Date Value Ref Range Status   07/28/2023 22 0 - 40 mg/dL Final     Triglycerides   Date Value Ref Range Status   07/28/2023 109 0 - 149 mg/dL Final     Comment:     .      AGE      DESIRABLE   BORDERLINE HIGH   HIGH     VERY HIGH   0 D-90 D    19 - 174         ----         ----        ----  91 D- 9 Y     0 -  74        75 -  99     >/= 100      ----    10-19 Y     0 -  89        90 - 129     >/= 130      ----    20-24 Y     0 - 114       115 - 149     >/= 150      ----         >24 Y     0 - 149       150 - 199    200- 499    >/= 500  .   Venipuncture immediately after or during the    administration of Metamizole may lead to falsely   low results. Testing should be performed immediately   prior to Metamizole dosing.       HgbA1c:  No results found for: \"HGBA1C\"    The 10-year ASCVD risk score (Michel COOMBS, et al., 2019) is: 1.8%    Values used to calculate the score:      Age: 49 years      Sex: Female      Is Non- : Yes      Diabetic: No      Tobacco smoker: No      Systolic Blood Pressure: 124 mmHg      Is BP treated: No      HDL Cholesterol: 57.5 mg/dL      Total Cholesterol: 201 mg/dL    ASSESSMENT / PLAN:    Problem List Items Addressed This Visit       Insomnia    Diffuse goiter    Relevant Orders    TSH    Human immunodeficiency virus infection - Primary    Overview     HIV history entered by HIV care provider: PLEASE DO NOT DELETE     Initial diagnosis:  8/21/10  CD4 rosa of 380  on 3/1/11.    Pertinent Screens:  HAV Ab:   Reactive  8/25/10  HBsAg:  Non-reactive  8/25/10  HBsAb:  Positive,  105.4  2/3/15  HBcAb:  Pending  10/3/24  HCV Ab:  Non-reactive  2/3/15  CMV IgG:  Negative  8/25/20  Toxo IgG:  Negative  8/25/20  G6PD:  Normal  8/25/10  HLA B57-01:  Not done  Syphilis IgG:  Non-reactive  4/21/22  PPD/IGRA:  PPD positive  early 2004,  TBQG  negative  11/5/12  HIV Genotype:  " 3/1/11  Major Mutation Hx:     HAART Hx:  Atripla:  5/9/11 - 3/19/15;  Stribild:  3/19/15 - current    HIV associated illnesses/Fay:  HSIL, cervix           Current Assessment & Plan     Returning to clinic after being absent for > 2 years.  Has continued taking Stribild daily until 3 weeks ago.    Previously, she remained immunologically intact and virologically suppressed on Stribild.    Routine labs will be collected today: CBC w/ diff, Hepatic panel, renal panel, CD4, HIV RNA PCR, and HIV genotype.    If she is undetectable, may transition to Biktarvy.    Key chain pill box given to her for ability to carry med discreetly to work.    Vaccines updated: agreeable to Flu vaccination  STD screening not indicated as not sexually active.    Will RTC in 4 months          Relevant Orders    Hepatitis B core antibody, total    Flu vaccine, trivalent, preservative free, age 6 months and greater (Fluarix/Fluzone/Flulaval) (Completed)    HIV Genotype Assay     Other Visit Diagnoses       Allergic rhinitis, unspecified seasonality, unspecified trigger        Relevant Medications    desloratadine (Clarinex) 5 mg tablet    Encounter for screening mammogram for malignant neoplasm of breast        Relevant Orders    BI mammo bilateral screening tomosynthesis    Screening for STD (sexually transmitted disease)        High risk medication use                Amy Castillo MD

## 2024-10-03 NOTE — LETTER
10/03/24    [place]  [address] [address]  [place] [address]      Dear Dr. Christopher D'Amico, DO,    I am writing to confirm that your patient, Aggie Villalta, received care in my office on 10/03/24. I have enclosed a summary of the care provided to Aggie for your reference.    Please contact me with any questions you may have regarding the visit.    Sincerely,         Amy Castillo MD  [address]  [facility] [address]  [phone]    CC: No Recipients

## 2024-10-03 NOTE — PATIENT INSTRUCTIONS
It was great to see you today!  Your last blood work that we did in clinic showed that your T-cells (CD4 count) was 1,502 / 55%.  Your virus was fully suppressed at less than 20 copies.  These labs were from April 2022.  We collected routine blood work today.  I will see you back in clinic in 4 months.  If any issues should arise before then, please remember to call the clinic and get in contact with your nurse.    Today you received your yearly vaccine for influenza (the FLU).  Remember!  This is a shot that you get every year     Wishing you and your loved ones a very Happy Holiday Season!!  Be safe and see you next year!!

## 2024-10-04 LAB
C TRACH RRNA SPEC QL NAA+PROBE: NEGATIVE
CD3+CD4+ CELLS # BLD: 1.51 X10E9/L
CD3+CD4+ CELLS # BLD: 1513 /MM3
CD3+CD4+ CELLS NFR BLD: 55 %
CD3+CD4+ CELLS/CD3+CD8+ CLL BLD: 2.29 %
CD3+CD8+ CELLS # BLD: 0.66 X10E9/L
CD3+CD8+ CELLS NFR BLD: 24 %
HIV1 RNA # PLAS NAA DL=20: NOT DETECTED {COPIES}/ML
HIV1 RNA SPEC NAA+PROBE-LOG#: NORMAL {LOG_COPIES}/ML
LYMPHOCYTES # SPEC AUTO: 2.75 X10*3/UL
N GONORRHOEA DNA SPEC QL PROBE+SIG AMP: NEGATIVE

## 2024-10-05 ENCOUNTER — PHARMACY VISIT (OUTPATIENT)
Dept: PHARMACY | Facility: CLINIC | Age: 49
End: 2024-10-05
Payer: MEDICARE

## 2024-10-09 ENCOUNTER — APPOINTMENT (OUTPATIENT)
Dept: DERMATOLOGY | Facility: CLINIC | Age: 49
End: 2024-10-09
Payer: COMMERCIAL

## 2024-10-10 ENCOUNTER — APPOINTMENT (OUTPATIENT)
Dept: PRIMARY CARE | Facility: CLINIC | Age: 49
End: 2024-10-10
Payer: COMMERCIAL

## 2024-10-16 ENCOUNTER — APPOINTMENT (OUTPATIENT)
Dept: RADIOLOGY | Facility: HOSPITAL | Age: 49
End: 2024-10-16
Payer: COMMERCIAL

## 2024-11-01 ENCOUNTER — APPOINTMENT (OUTPATIENT)
Dept: DERMATOLOGY | Facility: CLINIC | Age: 49
End: 2024-11-01
Payer: COMMERCIAL

## 2024-11-26 DIAGNOSIS — B20 HIV DISEASE (MULTI): ICD-10-CM

## 2024-11-27 RX ORDER — ELVITEGRAVIR, COBICISTAT, EMTRICITABINE, AND TENOFOVIR DISOPROXIL FUMARATE 150; 150; 200; 300 MG/1; MG/1; MG/1; MG/1
1 TABLET, FILM COATED ORAL DAILY
Qty: 30 TABLET | Refills: 0 | Status: SHIPPED | OUTPATIENT
Start: 2024-11-27

## 2024-12-11 ENCOUNTER — APPOINTMENT (OUTPATIENT)
Dept: OBSTETRICS AND GYNECOLOGY | Facility: CLINIC | Age: 49
End: 2024-12-11
Payer: COMMERCIAL

## 2024-12-24 ENCOUNTER — APPOINTMENT (OUTPATIENT)
Dept: PRIMARY CARE | Facility: CLINIC | Age: 49
End: 2024-12-24
Payer: COMMERCIAL

## 2025-01-09 ENCOUNTER — APPOINTMENT (OUTPATIENT)
Dept: PRIMARY CARE | Facility: CLINIC | Age: 50
End: 2025-01-09
Payer: COMMERCIAL

## 2025-02-05 PROCEDURE — RXMED WILLOW AMBULATORY MEDICATION CHARGE

## 2025-02-06 ENCOUNTER — APPOINTMENT (OUTPATIENT)
Dept: IMMUNOLOGY | Facility: CLINIC | Age: 50
End: 2025-02-06
Payer: COMMERCIAL

## 2025-02-08 ENCOUNTER — PHARMACY VISIT (OUTPATIENT)
Dept: PHARMACY | Facility: CLINIC | Age: 50
End: 2025-02-08
Payer: MEDICARE

## 2025-02-14 ENCOUNTER — APPOINTMENT (OUTPATIENT)
Dept: DERMATOLOGY | Facility: CLINIC | Age: 50
End: 2025-02-14
Payer: COMMERCIAL

## 2025-03-18 DIAGNOSIS — B20 HIV DISEASE (MULTI): ICD-10-CM

## 2025-03-18 PROCEDURE — RXMED WILLOW AMBULATORY MEDICATION CHARGE

## 2025-03-18 RX ORDER — ELVITEGRAVIR, COBICISTAT, EMTRICITABINE, AND TENOFOVIR DISOPROXIL FUMARATE 150; 150; 200; 300 MG/1; MG/1; MG/1; MG/1
1 TABLET, FILM COATED ORAL DAILY
Qty: 30 TABLET | Refills: 5 | Status: SHIPPED | OUTPATIENT
Start: 2025-03-18

## 2025-03-25 ENCOUNTER — PHARMACY VISIT (OUTPATIENT)
Dept: PHARMACY | Facility: CLINIC | Age: 50
End: 2025-03-25
Payer: MEDICARE

## 2025-04-03 ENCOUNTER — OFFICE VISIT (OUTPATIENT)
Dept: PRIMARY CARE | Facility: CLINIC | Age: 50
End: 2025-04-03
Payer: COMMERCIAL

## 2025-04-03 VITALS
OXYGEN SATURATION: 96 % | BODY MASS INDEX: 34.36 KG/M2 | SYSTOLIC BLOOD PRESSURE: 118 MMHG | HEART RATE: 92 BPM | DIASTOLIC BLOOD PRESSURE: 76 MMHG | HEIGHT: 70 IN | WEIGHT: 240 LBS

## 2025-04-03 DIAGNOSIS — N89.8 VAGINAL DISCHARGE: ICD-10-CM

## 2025-04-03 DIAGNOSIS — R73.9 HYPERGLYCEMIA: ICD-10-CM

## 2025-04-03 DIAGNOSIS — K76.0 NAFLD (NONALCOHOLIC FATTY LIVER DISEASE): ICD-10-CM

## 2025-04-03 DIAGNOSIS — B37.31 VAGINAL CANDIDIASIS: ICD-10-CM

## 2025-04-03 DIAGNOSIS — G89.29 CHRONIC BACK PAIN, UNSPECIFIED BACK LOCATION, UNSPECIFIED BACK PAIN LATERALITY: ICD-10-CM

## 2025-04-03 DIAGNOSIS — Z12.31 ENCOUNTER FOR SCREENING MAMMOGRAM FOR MALIGNANT NEOPLASM OF BREAST: ICD-10-CM

## 2025-04-03 DIAGNOSIS — M54.9 CHRONIC BACK PAIN, UNSPECIFIED BACK LOCATION, UNSPECIFIED BACK PAIN LATERALITY: ICD-10-CM

## 2025-04-03 DIAGNOSIS — Z21 HIV INFECTION, UNSPECIFIED SYMPTOM STATUS: ICD-10-CM

## 2025-04-03 DIAGNOSIS — R30.0 DYSURIA: ICD-10-CM

## 2025-04-03 DIAGNOSIS — F17.210 CIGARETTE NICOTINE DEPENDENCE WITHOUT COMPLICATION: ICD-10-CM

## 2025-04-03 DIAGNOSIS — E55.9 VITAMIN D DEFICIENCY: ICD-10-CM

## 2025-04-03 DIAGNOSIS — M25.512 CHRONIC LEFT SHOULDER PAIN: Primary | ICD-10-CM

## 2025-04-03 DIAGNOSIS — Z00.00 WELLNESS EXAMINATION: ICD-10-CM

## 2025-04-03 DIAGNOSIS — G89.29 CHRONIC LEFT SHOULDER PAIN: Primary | ICD-10-CM

## 2025-04-03 PROBLEM — R32 URINARY INCONTINENCE: Status: ACTIVE | Noted: 2025-04-03

## 2025-04-03 PROBLEM — N92.0 MENORRHAGIA: Status: ACTIVE | Noted: 2025-04-03

## 2025-04-03 PROBLEM — K62.5 RECTAL HEMORRHAGE: Status: ACTIVE | Noted: 2025-04-03

## 2025-04-03 PROBLEM — M79.10 MUSCLE PAIN: Status: ACTIVE | Noted: 2025-04-03

## 2025-04-03 PROBLEM — N95.1 MENOPAUSAL SYMPTOM: Status: ACTIVE | Noted: 2025-04-03

## 2025-04-03 PROCEDURE — 99396 PREV VISIT EST AGE 40-64: CPT | Performed by: STUDENT IN AN ORGANIZED HEALTH CARE EDUCATION/TRAINING PROGRAM

## 2025-04-03 PROCEDURE — 3008F BODY MASS INDEX DOCD: CPT | Performed by: STUDENT IN AN ORGANIZED HEALTH CARE EDUCATION/TRAINING PROGRAM

## 2025-04-03 PROCEDURE — 1036F TOBACCO NON-USER: CPT | Performed by: STUDENT IN AN ORGANIZED HEALTH CARE EDUCATION/TRAINING PROGRAM

## 2025-04-03 PROCEDURE — 99214 OFFICE O/P EST MOD 30 MIN: CPT | Performed by: STUDENT IN AN ORGANIZED HEALTH CARE EDUCATION/TRAINING PROGRAM

## 2025-04-03 RX ORDER — PREDNISONE 20 MG/1
40 TABLET ORAL DAILY
Qty: 10 TABLET | Refills: 0 | Status: SHIPPED | OUTPATIENT
Start: 2025-04-03 | End: 2025-04-08

## 2025-04-03 RX ORDER — FLUCONAZOLE 150 MG/1
150 TABLET ORAL ONCE
Qty: 1 TABLET | Refills: 0 | Status: SHIPPED | OUTPATIENT
Start: 2025-04-03 | End: 2025-04-03

## 2025-04-03 NOTE — PROGRESS NOTES
49-year-old female presenting for follow-up on multiple concerns, CPE.    Vaginal discharge, dysuria  Reports cottage cheeselike discharge, and vaginal pruritus, wants urine checked, empiric treatment for vaginal candidiasis.    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  Is left-handed.  Denies trauma, insidious onset.    Chronic low back pain  Would like to do physical therapy.    HIV  Following with specialist    NAFLD  Incidental finding on CT, labs have been good.  Ultrasound ordered in 2023, results not viewable and epic    SocHx:   - Smoking: Quit 3 years ago   - Alcohol: None   - Drug use: None    Denies HA, changes in vision, chest pain, SOB/CHAN, palpitations, N/V/D/C, dysuria/hematuria, hematochezia/melena, paresthesias, LE edema.    12 point ROS reviewed and negative other than as stated in HPI      General: Alert, oriented, pleasant, in no acute distress  HEENT:      Head: normocephalic, atraumatic;      eyes: EOMI, no scleral icterus;   Neck: soft, supple, non-tender, no masses appreciated  CV: Heart with regular rate and rhythm, normal S1/S2, no murmurs  Lungs: CTAB without wheezing, rhonchi or rales; good respiratory effort, no increased work of breathing  Abdomen: soft, non-tender, non-distended, no masses appreciated  Extremities: no edema, no cyanosis  MSK: Positive Jobes on the left  Neuro: Cranial nerves grossly intact; alert and oriented, normal gait  Psych: Appropriate mood and affect    #HM  -CBC, CMP, Lipid panel, Vit D, TSH with reflex T4  -Vaccines:       Flu: out of  season      Shingrix: Recommended, deferred at this time      Pneumococcal: UTD      Tdap: UTD 2023  -Mam w/ lobo: ordered  -Last PAP: Follows with GYN  -Lung cancer screening with low-dose CT: quit 5 years ago, 30 pack year, screening ordered  -Colonoscopy: 2022, 5 year plan  -Osteoporosis screening: at 65    #Vaginal discharge #vaginal  itching  - UA, culture  - Will treat empirically with Diflucan 150 mg once    #Left shoulder pain  - X-ray  - Prednisone 40 mg x 5 days, failed NSAID and Tylenol  - Physical therapy    #Low back pain  - Physical therapy    #HIV  -Follows with specialist     #NAFLD  -Most current LFTs within normal limits  - Ultrasound liver in 07/2023, results not available in epic    F/U 6 months or sooner if indicated    Chris D'Amico, DO

## 2025-04-05 LAB
25(OH)D3+25(OH)D2 SERPL-MCNC: 39 NG/ML (ref 30–100)
ALBUMIN SERPL-MCNC: 4 G/DL (ref 3.6–5.1)
ALP SERPL-CCNC: 45 U/L (ref 37–153)
ALT SERPL-CCNC: 12 U/L (ref 6–29)
ANION GAP SERPL CALCULATED.4IONS-SCNC: 8 MMOL/L (CALC) (ref 7–17)
APPEARANCE UR: CLEAR
AST SERPL-CCNC: 15 U/L (ref 10–35)
BACTERIA #/AREA URNS HPF: ABNORMAL /HPF
BACTERIA UR CULT: NORMAL
BASOPHILS # BLD AUTO: 21 CELLS/UL (ref 0–200)
BASOPHILS NFR BLD AUTO: 0.3 %
BILIRUB SERPL-MCNC: 0.4 MG/DL (ref 0.2–1.2)
BILIRUB UR QL STRIP: NEGATIVE
BUN SERPL-MCNC: 11 MG/DL (ref 7–25)
CALCIUM SERPL-MCNC: 9.1 MG/DL (ref 8.6–10.4)
CHLORIDE SERPL-SCNC: 105 MMOL/L (ref 98–110)
CHOLEST SERPL-MCNC: 185 MG/DL
CHOLEST/HDLC SERPL: 4 (CALC)
CO2 SERPL-SCNC: 24 MMOL/L (ref 20–32)
COLOR UR: ABNORMAL
CREAT SERPL-MCNC: 0.8 MG/DL (ref 0.5–1.03)
EGFRCR SERPLBLD CKD-EPI 2021: 89 ML/MIN/1.73M2
EOSINOPHIL # BLD AUTO: 62 CELLS/UL (ref 15–500)
EOSINOPHIL NFR BLD AUTO: 0.9 %
ERYTHROCYTE [DISTWIDTH] IN BLOOD BY AUTOMATED COUNT: 14.1 % (ref 11–15)
EST. AVERAGE GLUCOSE BLD GHB EST-MCNC: 123 MG/DL
EST. AVERAGE GLUCOSE BLD GHB EST-SCNC: 6.8 MMOL/L
GLUCOSE SERPL-MCNC: 86 MG/DL (ref 65–99)
GLUCOSE UR QL STRIP: NEGATIVE
HBA1C MFR BLD: 5.9 % OF TOTAL HGB
HCT VFR BLD AUTO: 37.8 % (ref 35–45)
HDLC SERPL-MCNC: 46 MG/DL
HGB BLD-MCNC: 12.3 G/DL (ref 11.7–15.5)
HGB UR QL STRIP: NEGATIVE
HYALINE CASTS #/AREA URNS LPF: ABNORMAL /LPF
KETONES UR QL STRIP: ABNORMAL
LDLC SERPL CALC-MCNC: 104 MG/DL (CALC)
LEUKOCYTE ESTERASE UR QL STRIP: NEGATIVE
LYMPHOCYTES # BLD AUTO: 3036 CELLS/UL (ref 850–3900)
LYMPHOCYTES NFR BLD AUTO: 44 %
MCH RBC QN AUTO: 26.3 PG (ref 27–33)
MCHC RBC AUTO-ENTMCNC: 32.5 G/DL (ref 32–36)
MCV RBC AUTO: 80.9 FL (ref 80–100)
MONOCYTES # BLD AUTO: 352 CELLS/UL (ref 200–950)
MONOCYTES NFR BLD AUTO: 5.1 %
NEUTROPHILS # BLD AUTO: 3429 CELLS/UL (ref 1500–7800)
NEUTROPHILS NFR BLD AUTO: 49.7 %
NITRITE UR QL STRIP: NEGATIVE
NONHDLC SERPL-MCNC: 139 MG/DL (CALC)
PH UR STRIP: 6.5 [PH] (ref 5–8)
PLATELET # BLD AUTO: 257 THOUSAND/UL (ref 140–400)
PMV BLD REES-ECKER: 9.8 FL (ref 7.5–12.5)
POTASSIUM SERPL-SCNC: 3.9 MMOL/L (ref 3.5–5.3)
PROT SERPL-MCNC: 7.1 G/DL (ref 6.1–8.1)
PROT UR QL STRIP: ABNORMAL
RBC # BLD AUTO: 4.67 MILLION/UL (ref 3.8–5.1)
RBC #/AREA URNS HPF: ABNORMAL /HPF
SERVICE CMNT-IMP: ABNORMAL
SODIUM SERPL-SCNC: 137 MMOL/L (ref 135–146)
SP GR UR STRIP: 1.03 (ref 1–1.03)
SQUAMOUS #/AREA URNS HPF: ABNORMAL /HPF
TRIGL SERPL-MCNC: 232 MG/DL
TSH SERPL-ACNC: 0.86 MIU/L
WBC # BLD AUTO: 6.9 THOUSAND/UL (ref 3.8–10.8)
WBC #/AREA URNS HPF: ABNORMAL /HPF

## 2025-04-06 NOTE — RESULT ENCOUNTER NOTE
LDL borderline elevated at 104, triglycerides mild to moderately elevated at 232, low ASCVD 10-year risk score at 1.8%.  I do recommend her working on diet, decreasing processed foods, Mediterranean diet.    A1c 5.9, prediabetes, continue to improve diet, recommend Mediterranean diet.    Trace ketones and trace proteins in urine, urine culture without any growth, hopefully Diflucan worked, as yeast infection more likely than bacterial infection.    Remaining labs unremarkable

## 2025-04-15 ENCOUNTER — APPOINTMENT (OUTPATIENT)
Dept: OBSTETRICS AND GYNECOLOGY | Facility: HOSPITAL | Age: 50
End: 2025-04-15
Payer: COMMERCIAL

## 2025-04-17 ENCOUNTER — APPOINTMENT (OUTPATIENT)
Dept: RADIOLOGY | Facility: HOSPITAL | Age: 50
End: 2025-04-17
Payer: COMMERCIAL

## 2025-04-24 ENCOUNTER — APPOINTMENT (OUTPATIENT)
Dept: RADIOLOGY | Facility: CLINIC | Age: 50
End: 2025-04-24
Payer: COMMERCIAL

## 2025-04-28 ENCOUNTER — APPOINTMENT (OUTPATIENT)
Dept: RADIOLOGY | Facility: CLINIC | Age: 50
End: 2025-04-28
Payer: COMMERCIAL

## 2025-05-01 ENCOUNTER — HOSPITAL ENCOUNTER (OUTPATIENT)
Dept: RADIOLOGY | Facility: CLINIC | Age: 50
End: 2025-05-01
Payer: COMMERCIAL

## 2025-05-13 ENCOUNTER — APPOINTMENT (OUTPATIENT)
Dept: PRIMARY CARE | Facility: CLINIC | Age: 50
End: 2025-05-13
Payer: COMMERCIAL

## 2025-05-16 ENCOUNTER — APPOINTMENT (OUTPATIENT)
Dept: RADIOLOGY | Facility: CLINIC | Age: 50
End: 2025-05-16
Payer: COMMERCIAL

## 2025-06-05 ENCOUNTER — HOSPITAL ENCOUNTER (OUTPATIENT)
Dept: RADIOLOGY | Facility: CLINIC | Age: 50
Discharge: HOME | End: 2025-06-05
Payer: COMMERCIAL

## 2025-06-05 VITALS — HEIGHT: 70 IN | BODY MASS INDEX: 34.36 KG/M2 | WEIGHT: 240 LBS

## 2025-06-05 DIAGNOSIS — F17.210 CIGARETTE NICOTINE DEPENDENCE WITHOUT COMPLICATION: ICD-10-CM

## 2025-06-05 DIAGNOSIS — Z12.31 ENCOUNTER FOR SCREENING MAMMOGRAM FOR MALIGNANT NEOPLASM OF BREAST: ICD-10-CM

## 2025-06-05 DIAGNOSIS — M25.512 CHRONIC LEFT SHOULDER PAIN: ICD-10-CM

## 2025-06-05 DIAGNOSIS — G89.29 CHRONIC LEFT SHOULDER PAIN: ICD-10-CM

## 2025-06-05 PROCEDURE — 71271 CT THORAX LUNG CANCER SCR C-: CPT

## 2025-06-05 PROCEDURE — 77063 BREAST TOMOSYNTHESIS BI: CPT | Performed by: RADIOLOGY

## 2025-06-05 PROCEDURE — 77067 SCR MAMMO BI INCL CAD: CPT | Performed by: RADIOLOGY

## 2025-06-05 PROCEDURE — 77067 SCR MAMMO BI INCL CAD: CPT

## 2025-06-05 PROCEDURE — 73030 X-RAY EXAM OF SHOULDER: CPT | Mod: LEFT SIDE | Performed by: RADIOLOGY

## 2025-06-05 PROCEDURE — 73030 X-RAY EXAM OF SHOULDER: CPT | Mod: LT

## 2025-06-08 NOTE — RESULT ENCOUNTER NOTE
No suspicious lung nodules, continue with annual screening.    Enlarged thyroid.  Normal thyroid labs.  No comments on nodules of any sort.  Could consider thyroid ultrasound.    Fatty liver.,  Has had normal blood markers.  Continue to improve diet, general recommendations are to lose 5-10% of body weight.

## 2025-06-08 NOTE — RESULT ENCOUNTER NOTE
X-ray of left shoulder shows mild arthritis.  Hopefully took prednisone and did physical therapy and is feeling better.  If not, will send to orthopedics.

## 2025-06-25 PROCEDURE — RXMED WILLOW AMBULATORY MEDICATION CHARGE

## 2025-07-02 ENCOUNTER — PHARMACY VISIT (OUTPATIENT)
Dept: PHARMACY | Facility: CLINIC | Age: 50
End: 2025-07-02
Payer: MEDICARE

## 2025-07-10 ENCOUNTER — APPOINTMENT (OUTPATIENT)
Dept: PRIMARY CARE | Facility: CLINIC | Age: 50
End: 2025-07-10
Payer: COMMERCIAL

## 2025-07-18 ENCOUNTER — APPOINTMENT (OUTPATIENT)
Dept: DERMATOLOGY | Facility: CLINIC | Age: 50
End: 2025-07-18
Payer: COMMERCIAL

## 2025-07-31 ENCOUNTER — APPOINTMENT (OUTPATIENT)
Dept: PRIMARY CARE | Facility: CLINIC | Age: 50
End: 2025-07-31
Payer: COMMERCIAL

## 2025-08-07 ENCOUNTER — APPOINTMENT (OUTPATIENT)
Dept: PRIMARY CARE | Facility: CLINIC | Age: 50
End: 2025-08-07
Payer: COMMERCIAL

## 2025-09-02 ENCOUNTER — APPOINTMENT (OUTPATIENT)
Dept: OBSTETRICS AND GYNECOLOGY | Facility: HOSPITAL | Age: 50
End: 2025-09-02
Payer: COMMERCIAL

## 2025-09-09 ENCOUNTER — APPOINTMENT (OUTPATIENT)
Dept: PRIMARY CARE | Facility: CLINIC | Age: 50
End: 2025-09-09
Payer: COMMERCIAL

## 2025-09-30 ENCOUNTER — APPOINTMENT (OUTPATIENT)
Dept: PRIMARY CARE | Facility: CLINIC | Age: 50
End: 2025-09-30
Payer: COMMERCIAL

## 2025-10-09 ENCOUNTER — APPOINTMENT (OUTPATIENT)
Dept: PRIMARY CARE | Facility: CLINIC | Age: 50
End: 2025-10-09
Payer: COMMERCIAL

## 2025-11-04 ENCOUNTER — APPOINTMENT (OUTPATIENT)
Dept: OBSTETRICS AND GYNECOLOGY | Facility: HOSPITAL | Age: 50
End: 2025-11-04
Payer: COMMERCIAL

## 2026-02-06 ENCOUNTER — APPOINTMENT (OUTPATIENT)
Dept: DERMATOLOGY | Facility: CLINIC | Age: 51
End: 2026-02-06
Payer: COMMERCIAL